# Patient Record
Sex: FEMALE | Race: WHITE | NOT HISPANIC OR LATINO | Employment: OTHER | URBAN - METROPOLITAN AREA
[De-identification: names, ages, dates, MRNs, and addresses within clinical notes are randomized per-mention and may not be internally consistent; named-entity substitution may affect disease eponyms.]

---

## 2022-05-20 ENCOUNTER — OFFICE VISIT (OUTPATIENT)
Dept: GASTROENTEROLOGY | Facility: CLINIC | Age: 77
End: 2022-05-20
Payer: MEDICARE

## 2022-05-20 ENCOUNTER — TELEPHONE (OUTPATIENT)
Dept: GASTROENTEROLOGY | Facility: CLINIC | Age: 77
End: 2022-05-20

## 2022-05-20 VITALS
DIASTOLIC BLOOD PRESSURE: 60 MMHG | WEIGHT: 126 LBS | HEIGHT: 66 IN | SYSTOLIC BLOOD PRESSURE: 104 MMHG | BODY MASS INDEX: 20.25 KG/M2

## 2022-05-20 DIAGNOSIS — D50.0 IRON DEFICIENCY ANEMIA DUE TO CHRONIC BLOOD LOSS: Primary | ICD-10-CM

## 2022-05-20 DIAGNOSIS — Z98.890 STATUS POST DILATION OF ESOPHAGEAL NARROWING: ICD-10-CM

## 2022-05-20 DIAGNOSIS — D50.0 IRON DEFICIENCY ANEMIA DUE TO CHRONIC BLOOD LOSS: ICD-10-CM

## 2022-05-20 DIAGNOSIS — K21.01 GASTROESOPHAGEAL REFLUX DISEASE WITH ESOPHAGITIS AND HEMORRHAGE: ICD-10-CM

## 2022-05-20 DIAGNOSIS — K76.9 LIVER LESION: ICD-10-CM

## 2022-05-20 DIAGNOSIS — Z87.19 STATUS POST DILATION OF ESOPHAGEAL NARROWING: ICD-10-CM

## 2022-05-20 DIAGNOSIS — K29.70 HELICOBACTER PYLORI GASTRITIS: ICD-10-CM

## 2022-05-20 DIAGNOSIS — K22.2 STRICTURE OF ESOPHAGUS: ICD-10-CM

## 2022-05-20 DIAGNOSIS — B96.81 HELICOBACTER PYLORI GASTRITIS: ICD-10-CM

## 2022-05-20 DIAGNOSIS — E44.0 MODERATE PROTEIN-CALORIE MALNUTRITION (HCC): ICD-10-CM

## 2022-05-20 DIAGNOSIS — F10.10 ALCOHOL ABUSE: ICD-10-CM

## 2022-05-20 PROBLEM — K21.00 GASTRO-ESOPHAGEAL REFLUX DISEASE WITH ESOPHAGITIS: Status: ACTIVE | Noted: 2017-11-10

## 2022-05-20 PROBLEM — N17.0 ACUTE KIDNEY FAILURE WITH TUBULAR NECROSIS (HCC): Status: ACTIVE | Noted: 2021-10-04

## 2022-05-20 PROBLEM — D36.9 TUBULAR ADENOMA: Status: ACTIVE | Noted: 2017-11-15

## 2022-05-20 PROBLEM — N32.81 OVERACTIVE BLADDER: Status: ACTIVE | Noted: 2018-06-18

## 2022-05-20 PROBLEM — J47.9 BRONCHIECTASIS WITHOUT COMPLICATION (HCC): Status: ACTIVE | Noted: 2018-08-31

## 2022-05-20 PROCEDURE — 99214 OFFICE O/P EST MOD 30 MIN: CPT | Performed by: INTERNAL MEDICINE

## 2022-05-20 RX ORDER — VITAMIN B COMPLEX
1 CAPSULE ORAL DAILY
COMMUNITY

## 2022-05-20 RX ORDER — LANOLIN ALCOHOL/MO/W.PET/CERES
100 CREAM (GRAM) TOPICAL DAILY
COMMUNITY

## 2022-05-20 RX ORDER — ROSUVASTATIN CALCIUM 40 MG/1
40 TABLET, COATED ORAL DAILY
COMMUNITY

## 2022-05-20 RX ORDER — PANTOPRAZOLE SODIUM 40 MG/1
40 TABLET, DELAYED RELEASE ORAL DAILY
Qty: 30 TABLET | Refills: 5 | Status: SHIPPED | OUTPATIENT
Start: 2022-05-20

## 2022-05-20 RX ORDER — PANTOPRAZOLE SODIUM 40 MG/1
40 TABLET, DELAYED RELEASE ORAL DAILY
COMMUNITY
End: 2022-05-20 | Stop reason: SDUPTHER

## 2022-05-20 RX ORDER — ACETAMINOPHEN 500 MG
500 TABLET ORAL EVERY 6 HOURS PRN
COMMUNITY

## 2022-05-20 NOTE — PROGRESS NOTES
[id number] [employer] [facility] - Outpatient Follow-up Note  Luis Enrique Gross 77 y o  female MRN: [id number]  Encounter: [id number]    ASSESSMENT AND PLAN:      1  Iron deficiency anemia due to chronic blood loss  76F with ETOH abuse (quit since 4/22/22 when hospitalized) here today for f/u  No overt GIB  Likely 2/2 poor PO intake but will need to evaluate for GI blood loss  Did have some esophagitis at last EGD but unclear if that could drop her hgb to 8 1     - CBC and differential; Future  - Comprehensive metabolic panel; Future  - Fe+TIBC+Lc; Future    - Schedule EGD/COLON at hospital setting for possible balloon dilation and anemia work up  - Pending iron panel, consider IV iron with hematology    - Sod Picosulfate-Mag Ox-Cit Acd 10-3 5-12 MG-GM -GM/160ML SOLN; Take 160 mL by mouth once for 1 dose Take 160 mL by mouth once for 1 dose  Dispense: 160 mL; Refill: 0    2  Gastroesophageal reflux disease with esophagitis and hemorrhage  Grade C esophagitis noted on EGD  Cont PPI for now  - Continue ETOH cessation  - pantoprazole (PROTONIX) 40 mg tablet; Take 1 tablet (40 mg total) by mouth in the morning  Dispense: 30 tablet; Refill: 5    3  Stricture of esophagus  S/p balloon dilation to 20mm  Will follow up  Dysphagia much improved  - pantoprazole (PROTONIX) 40 mg tablet; Take 1 tablet (40 mg total) by mouth in the morning  Dispense: 30 tablet; Refill: 5    4  Status post dilation of esophageal narrowing  EGD w dil 4/6/22    5  Helicobacter pylori gastritis  S/p tx  Will rebiopsy at time of EGD  6  Liver lesion  CT and MRI reviewed from [facility]  Benign cysts  No further work up needed  7  Alcohol abuse  Quit when hospitalized 4/4/22  Trying to look for ETOH rehab - will f/u w PCP  8  Moderate protein-calorie malnutrition ([address] )  Weight stabilized  Will recheck in 3 months        Followup Appointment: 3 months ______________________________________________________________________    Chief Complaint   Patient presents with    Follow-up     [facility]     HPI:  This is a 77 female who presents today with her  for follow-up  She was admitted to [facility] on April 4th and discharged on April 7th, sent to rehab and discharged on April 30th  During the hospitalization, she was admitted for weight loss, nausea vomiting in setting of alcohol abuse  She had lost over 30 lb according to her family  Patient states that this was due to not being able to eat  EGD at the time of admission did show esophageal stricture and H pylori gastritis  She was dilated to 20 mm and H pylori was treated for 2 weeks with triple therapy  Since, she has not been drinking any alcohol and her swallowing has since improved  She has stabilized with her weight  She she is back home and doing much better  Trying to get into inpatient rehab but her insurance does not cover so is working with her PCP to get other options  Regarding anemia, patient denies any GI bleeding  No further nausea  No diarrhea  No rectal bleeding  Last colonoscopy was over 15 years ago      Historical Information   Past Medical History:   Diagnosis Date    Hyperlipidemia     Hypertension      Past Surgical History:   Procedure Laterality Date    COLONOSCOPY       Social History     Substance and Sexual Activity   Alcohol Use Yes    Comment: social     Social History     Substance and Sexual Activity   Drug Use Not on file     Social History     Tobacco Use   Smoking Status Never Smoker   Smokeless Tobacco Never Used     Family History   Problem Relation Age of Onset    Colon cancer Neg Hx     Colon polyps Neg Hx          Current Outpatient Medications:     acetaminophen (TYLENOL) 500 mg tablet    Apoaequorin (Prevagen) 10 MG CAPS    b complex vitamins capsule    pantoprazole (PROTONIX) 40 mg tablet    rosuvastatin (CRESTOR) 40 MG tablet    Sod Picosulfate-Mag Ox-Cit Acd 10-3 5-12 MG-GM -GM/160ML SOLN    thiamine 100 MG tablet  Allergies   Allergen Reactions    Codeine GI Intolerance     Reviewed medications and allergies and updated as indicated    PHYSICAL EXAM:    Blood pressure 104/60, height 5' 6" (1 676 m), weight 57 2 kg (126 lb)  Body mass index is 20 34 kg/m²  General Appearance: NAD, cooperative, alert  Eyes: Anicteric, PERRLA, EOMI  ENT:  Normocephalic, atraumatic, normal mucosa  Neck:  Supple, symmetrical, trachea midline  Resp:  Clear to auscultation bilaterally; no rales, rhonchi or wheezing; respirations unlabored   CV:  S1 S2, Regular rate and rhythm; no murmur, rub, or gallop  GI:  Soft, non-tender, non-distended; normal bowel sounds; no masses, no organomegaly   Rectal: Deferred  Musculoskeletal: No cyanosis, clubbing or edema  Limited ROM  Walks with a walker  Skin:  No jaundice, rashes, or lesions   Heme/Lymph: No palpable cervical lymphadenopathy  Psych: Normal affect, good eye contact  Neuro: No gross deficits, AAOx3    Lab Results: All labs from [facility] from April 2022 reviewed  Macrocytic anemia  Radiology Results:   CT and MRI of the liver reviewed  The liver lesions are benign  Multiple nonenhancing hepatic lesions of fluid no consistent with liver cysts  Nothing concerning

## 2022-05-24 RX ORDER — SODIUM PICOSULFATE, MAGNESIUM OXIDE, AND ANHYDROUS CITRIC ACID 10; 3.5; 12 MG/160ML; G/160ML; G/160ML
LIQUID ORAL
Qty: 320 ML | Refills: 0 | Status: SHIPPED | COMMUNITY
Start: 2022-05-24

## 2022-05-24 NOTE — TELEPHONE ENCOUNTER
I called patient at [phone] reached her voicemail  I requested a callback to offer her a sample of the Clenpiq  If she does not want to come back to the office to  the sample, we will need to discuss an alternative prep

## 2022-05-24 NOTE — TELEPHONE ENCOUNTER
Pt called w/ ques; her phone kept cutting out  Noted sample prep was being offered  She did not know if she wanted it/is unsure she could get it picked up in time/did not know when proc was  Pt has combo sched'd @ [facility] 6/14  Ret'd call  Pt will accept prep sample of Clenpiq/is unsure when she will

## 2022-05-24 NOTE — TELEPHONE ENCOUNTER
Patient is scheduled for Colon at [facility] 6/14/22, she is not sure if she can come to the office to  bowel prep  Awaiting her callback as there was a poor connection with her phone

## 2022-05-31 ENCOUNTER — TELEPHONE (OUTPATIENT)
Dept: GASTROENTEROLOGY | Facility: CLINIC | Age: 77
End: 2022-05-31

## 2022-05-31 NOTE — TELEPHONE ENCOUNTER
Scheduled date of colonoscopy (as of today): 06/14/2022  Physician performing colonoscopy: Aniceto Maradiaga  Location of colonoscopy: [facility]  Bowel prep reviewed with patient: Clenpiq  Instructions reviewed with patient by: Aurora Catalan  Clearances:

## 2022-06-14 ENCOUNTER — TELEPHONE (OUTPATIENT)
Dept: GASTROENTEROLOGY | Facility: CLINIC | Age: 77
End: 2022-06-14

## 2022-06-14 NOTE — TELEPHONE ENCOUNTER
Stephen Cummings [phone] Please scan in egd and colon from [facility] today once path results available  Pt will need a small bowel capsule for iron def anemia  Thank you  Per Dr MALDONADO [facility]

## 2022-08-08 NOTE — TELEPHONE ENCOUNTER
Left message for patient that Dr Cezar Nickerson was hoping to get capsule done before OV and requested she call back to schedule

## 2023-09-05 ENCOUNTER — NURSE TRIAGE (OUTPATIENT)
Age: 78
End: 2023-09-05

## 2023-09-05 NOTE — TELEPHONE ENCOUNTER
Patient experiencing mid upper chest pain as previous April 2022. EGD w/dilation performed 4/2022. Patient agreeable to see another provider for earlier appointment. Patient scheduled with Dr. Pooja Vargas in [facility] 9/7/23. She was instructed to report to ER if symptoms increase.

## 2023-09-05 NOTE — TELEPHONE ENCOUNTER
----- Message from 51wan sent at 9/5/2023 10:52 AM EDT -----  Pt called in along with  requesting to speak to a nurse in regards to pts symptoms. Offered an appt as pt only wants to see Dr. Macario Contreras. First available is 11/24/23. Pt did not want to wait that long. Pt stated that she would like to consult with a nurse as she is having pain in her chest/vomiting and weakness in her legs. Pt stated that she will then go to the ER if symptoms worsen. Consent: Written consent was obtained and risks were reviewed including but not limited to scarring, infection, bleeding, scabbing, incomplete removal, nerve damage and allergy to anesthesia.

## 2023-09-07 ENCOUNTER — OFFICE VISIT (OUTPATIENT)
Age: 78
End: 2023-09-07
Payer: MEDICARE

## 2023-09-07 ENCOUNTER — TELEPHONE (OUTPATIENT)
Age: 78
End: 2023-09-07

## 2023-09-07 VITALS
DIASTOLIC BLOOD PRESSURE: 58 MMHG | HEIGHT: 66 IN | BODY MASS INDEX: 20.09 KG/M2 | WEIGHT: 125 LBS | SYSTOLIC BLOOD PRESSURE: 98 MMHG

## 2023-09-07 DIAGNOSIS — K21.00 GASTROESOPHAGEAL REFLUX DISEASE WITH ESOPHAGITIS WITHOUT HEMORRHAGE: Primary | ICD-10-CM

## 2023-09-07 DIAGNOSIS — K22.2 PEPTIC STRICTURE OF ESOPHAGUS: ICD-10-CM

## 2023-09-07 PROCEDURE — 99214 OFFICE O/P EST MOD 30 MIN: CPT | Performed by: INTERNAL MEDICINE

## 2023-09-07 RX ORDER — OMEPRAZOLE 40 MG/1
40 CAPSULE, DELAYED RELEASE ORAL DAILY
Qty: 90 CAPSULE | Refills: 4 | Status: SHIPPED | OUTPATIENT
Start: 2023-09-07

## 2023-09-07 RX ORDER — PUMPKIN SEED EXTRACT/SOY GERM 300 MG
CAPSULE ORAL
COMMUNITY

## 2023-09-07 RX ORDER — LOSARTAN POTASSIUM 25 MG/1
50 TABLET ORAL DAILY
COMMUNITY
Start: 2023-08-08

## 2023-09-07 RX ORDER — OXYBUTYNIN CHLORIDE 5 MG/1
TABLET, EXTENDED RELEASE ORAL
COMMUNITY
Start: 2023-08-25

## 2023-09-07 NOTE — H&P (VIEW-ONLY)
[facility] - Outpatient Follow-up Note  Carol Aguero 69 y.o. female MRN: [id number]  Encounter: [id number]    ASSESSMENT AND PLAN:      1. Gastroesophageal reflux disease with esophagitis without hemorrhage  She has known LA grade C erosive esophagitis, but seemingly has been off PPI and her symptoms have returned. I will proceed with upper endoscopy possible dilation and have prescribed omeprazole, advising her that she will need to take it indefinitely due to her severe erosive disease  - omeprazole (PriLOSEC) 40 MG capsule; Take 1 capsule (40 mg total) by mouth daily  Dispense: 90 capsule; Refill: 4  - EGD; Future    2. Peptic stricture of esophagus  As above  - omeprazole (PriLOSEC) 40 MG capsule; Take 1 capsule (40 mg total) by mouth daily  Dispense: 90 capsule; Refill: 4  - EGD; Future      Follow up appointment: For upper endoscopy and dilation  ______________________________________________________________________    Chief Complaint   Patient presents with   • Chest Pain     Patient having same symptoms as the last visit     HPI:   Patient is a 69 y.o. female with a significant PMH of LA grade C erosive esophagitis and peptic stricture presenting for follow up regarding return of her symptoms of heartburn, dysphagia, and odynophagia. About a year ago she was admitted to [facility] and upper endoscopy was performed that showed LA grade C erosive esophagitis and a peptic stricture that was dilated with TTS balloon up to 20 mm. She felt great after that endoscopy while taking pantoprazole. Repeat an endoscopy did show resolution of the esophagitis, however she seemingly stopped taking her pantoprazole. She has gotten a couple rounds of over-the-counter Prilosec to help with her symptoms, but she now has dysphagia to solid foods as well as odynophagia with even liquids.     Historical Information   Past Medical History:   Diagnosis Date   • Hyperlipidemia • Hypertension      Past Surgical History:   Procedure Laterality Date   • COLONOSCOPY       Social History     Substance and Sexual Activity   Alcohol Use Yes    Comment: social     Social History     Substance and Sexual Activity   Drug Use Not on file     Social History     Tobacco Use   Smoking Status Never   Smokeless Tobacco Never     Family History   Problem Relation Age of Onset   • Colon cancer Neg Hx    • Colon polyps Neg Hx          Current Outpatient Medications:   •  acetaminophen (TYLENOL) 500 mg tablet  •  Apoaequorin (Prevagen) 10 MG CAPS  •  losartan (COZAAR) 25 mg tablet  •  omeprazole (PriLOSEC) 40 MG capsule  •  oxybutynin (DITROPAN-XL) 5 mg 24 hr tablet  •  Pumpkin Seed-Soy Germ (AZO Bladder Control/Go-Less) CAPS  •  rosuvastatin (CRESTOR) 40 MG tablet  •  b complex vitamins capsule  •  Sod Picosulfate-Mag Ox-Cit Acd (Clenpiq) 10-3.5-12 MG-GM -GM/160ML SOLN  •  thiamine 100 MG tablet  Allergies   Allergen Reactions   • Codeine GI Intolerance     Reviewed medications and allergies and updated as indicated    PHYSICAL EXAM:    Blood pressure 98/58, height 5' 6" (1.676 m), weight 56.7 kg (125 lb). Body mass index is 20.18 kg/m². General Appearance: NAD, cooperative, alert  Eyes: Anicteric  GI:  Soft, non-tender, non-distended; normal bowel sounds; no masses, no organomegaly   Rectal: Deferred  Musculoskeletal: No edema. Skin:  No jaundice    Lab Results:   No results found for: "WBC", "HGB", "MCV", "PLT"  No results found for: "NA", "K", "CL", "CO2", "ANIONGAP", "BUN", "CREATININE", "GLUCOSE", "GLUF", "CALCIUM", "CORRECTEDCA", "AST", "ALT", "ALKPHOS", "PROT", "BILITOT", "EGFR"  No results found for: "IRON", "TIBC", "FERRITIN"  No results found for: "LIPASE"    Radiology Results:   No results found.

## 2023-09-07 NOTE — PROGRESS NOTES
[facility] - Outpatient Follow-up Note  Herminia Arellano 69 y.o. female MRN: [id number]  Encounter: [id number]    ASSESSMENT AND PLAN:      1. Gastroesophageal reflux disease with esophagitis without hemorrhage  She has known LA grade C erosive esophagitis, but seemingly has been off PPI and her symptoms have returned. I will proceed with upper endoscopy possible dilation and have prescribed omeprazole, advising her that she will need to take it indefinitely due to her severe erosive disease  - omeprazole (PriLOSEC) 40 MG capsule; Take 1 capsule (40 mg total) by mouth daily  Dispense: 90 capsule; Refill: 4  - EGD; Future    2. Peptic stricture of esophagus  As above  - omeprazole (PriLOSEC) 40 MG capsule; Take 1 capsule (40 mg total) by mouth daily  Dispense: 90 capsule; Refill: 4  - EGD; Future      Follow up appointment: For upper endoscopy and dilation  ______________________________________________________________________    Chief Complaint   Patient presents with   • Chest Pain     Patient having same symptoms as the last visit     HPI:   Patient is a 69 y.o. female with a significant PMH of LA grade C erosive esophagitis and peptic stricture presenting for follow up regarding return of her symptoms of heartburn, dysphagia, and odynophagia. About a year ago she was admitted to [facility] and upper endoscopy was performed that showed LA grade C erosive esophagitis and a peptic stricture that was dilated with TTS balloon up to 20 mm. She felt great after that endoscopy while taking pantoprazole. Repeat an endoscopy did show resolution of the esophagitis, however she seemingly stopped taking her pantoprazole. She has gotten a couple rounds of over-the-counter Prilosec to help with her symptoms, but she now has dysphagia to solid foods as well as odynophagia with even liquids.     Historical Information   Past Medical History:   Diagnosis Date   • Hyperlipidemia • Hypertension      Past Surgical History:   Procedure Laterality Date   • COLONOSCOPY       Social History     Substance and Sexual Activity   Alcohol Use Yes    Comment: social     Social History     Substance and Sexual Activity   Drug Use Not on file     Social History     Tobacco Use   Smoking Status Never   Smokeless Tobacco Never     Family History   Problem Relation Age of Onset   • Colon cancer Neg Hx    • Colon polyps Neg Hx          Current Outpatient Medications:   •  acetaminophen (TYLENOL) 500 mg tablet  •  Apoaequorin (Prevagen) 10 MG CAPS  •  losartan (COZAAR) 25 mg tablet  •  omeprazole (PriLOSEC) 40 MG capsule  •  oxybutynin (DITROPAN-XL) 5 mg 24 hr tablet  •  Pumpkin Seed-Soy Germ (AZO Bladder Control/Go-Less) CAPS  •  rosuvastatin (CRESTOR) 40 MG tablet  •  b complex vitamins capsule  •  Sod Picosulfate-Mag Ox-Cit Acd (Clenpiq) 10-3.5-12 MG-GM -GM/160ML SOLN  •  thiamine 100 MG tablet  Allergies   Allergen Reactions   • Codeine GI Intolerance     Reviewed medications and allergies and updated as indicated    PHYSICAL EXAM:    Blood pressure 98/58, height 5' 6" (1.676 m), weight 56.7 kg (125 lb). Body mass index is 20.18 kg/m². General Appearance: NAD, cooperative, alert  Eyes: Anicteric  GI:  Soft, non-tender, non-distended; normal bowel sounds; no masses, no organomegaly   Rectal: Deferred  Musculoskeletal: No edema. Skin:  No jaundice    Lab Results:   No results found for: "WBC", "HGB", "MCV", "PLT"  No results found for: "NA", "K", "CL", "CO2", "ANIONGAP", "BUN", "CREATININE", "GLUCOSE", "GLUF", "CALCIUM", "CORRECTEDCA", "AST", "ALT", "ALKPHOS", "PROT", "BILITOT", "EGFR"  No results found for: "IRON", "TIBC", "FERRITIN"  No results found for: "LIPASE"    Radiology Results:   No results found.

## 2023-09-07 NOTE — TELEPHONE ENCOUNTER
Scheduled date of EGD(as of today):9/11/23  Physician performing EGD:PHILLIP  Location of EGD:[facility]  Instructions reviewed with patient by:MONTY  Clearances: N

## 2023-09-11 ENCOUNTER — ANESTHESIA (OUTPATIENT)
Dept: GASTROENTEROLOGY | Facility: AMBULATORY SURGERY CENTER | Age: 78
End: 2023-09-11

## 2023-09-11 ENCOUNTER — ANESTHESIA EVENT (OUTPATIENT)
Dept: GASTROENTEROLOGY | Facility: AMBULATORY SURGERY CENTER | Age: 78
End: 2023-09-11

## 2023-09-11 ENCOUNTER — HOSPITAL ENCOUNTER (OUTPATIENT)
Dept: GASTROENTEROLOGY | Facility: AMBULATORY SURGERY CENTER | Age: 78
Discharge: HOME | End: 2023-09-11
Attending: INTERNAL MEDICINE
Payer: MEDICARE

## 2023-09-11 VITALS
WEIGHT: 125 LBS | BODY MASS INDEX: 20.09 KG/M2 | HEIGHT: 66 IN | OXYGEN SATURATION: 97 % | TEMPERATURE: 98 F | DIASTOLIC BLOOD PRESSURE: 56 MMHG | SYSTOLIC BLOOD PRESSURE: 96 MMHG | RESPIRATION RATE: 22 BRPM | HEART RATE: 72 BPM

## 2023-09-11 DIAGNOSIS — K22.2 PEPTIC STRICTURE OF ESOPHAGUS: ICD-10-CM

## 2023-09-11 DIAGNOSIS — K21.00 GASTROESOPHAGEAL REFLUX DISEASE WITH ESOPHAGITIS WITHOUT HEMORRHAGE: ICD-10-CM

## 2023-09-11 PROCEDURE — 43249 ESOPH EGD DILATION <30 MM: CPT | Performed by: INTERNAL MEDICINE

## 2023-09-11 RX ORDER — PROPOFOL 10 MG/ML
INJECTION, EMULSION INTRAVENOUS AS NEEDED
Status: DISCONTINUED | OUTPATIENT
Start: 2023-09-11 | End: 2023-09-11

## 2023-09-11 RX ORDER — SODIUM CHLORIDE, SODIUM LACTATE, POTASSIUM CHLORIDE, CALCIUM CHLORIDE 600; 310; 30; 20 MG/100ML; MG/100ML; MG/100ML; MG/100ML
50 INJECTION, SOLUTION INTRAVENOUS CONTINUOUS
Status: DISCONTINUED | OUTPATIENT
Start: 2023-09-11 | End: 2023-09-15 | Stop reason: HOSPADM

## 2023-09-11 RX ORDER — SODIUM CHLORIDE, SODIUM LACTATE, POTASSIUM CHLORIDE, CALCIUM CHLORIDE 600; 310; 30; 20 MG/100ML; MG/100ML; MG/100ML; MG/100ML
INJECTION, SOLUTION INTRAVENOUS CONTINUOUS PRN
Status: DISCONTINUED | OUTPATIENT
Start: 2023-09-11 | End: 2023-09-11

## 2023-09-11 RX ORDER — LIDOCAINE HYDROCHLORIDE 10 MG/ML
INJECTION, SOLUTION EPIDURAL; INFILTRATION; INTRACAUDAL; PERINEURAL AS NEEDED
Status: DISCONTINUED | OUTPATIENT
Start: 2023-09-11 | End: 2023-09-11

## 2023-09-11 RX ORDER — PHENYLEPHRINE HYDROCHLORIDE 10 MG/ML
INJECTION INTRAVENOUS AS NEEDED
Status: DISCONTINUED | OUTPATIENT
Start: 2023-09-11 | End: 2023-09-11

## 2023-09-11 RX ADMIN — PHENYLEPHRINE HYDROCHLORIDE 200 MCG: 10 INJECTION INTRAVENOUS at 15:02

## 2023-09-11 RX ADMIN — PHENYLEPHRINE HYDROCHLORIDE 100 MCG: 10 INJECTION INTRAVENOUS at 14:57

## 2023-09-11 RX ADMIN — LIDOCAINE HYDROCHLORIDE 70 MG: 10 INJECTION, SOLUTION EPIDURAL; INFILTRATION; INTRACAUDAL; PERINEURAL at 14:54

## 2023-09-11 RX ADMIN — SODIUM CHLORIDE, SODIUM LACTATE, POTASSIUM CHLORIDE, CALCIUM CHLORIDE 50 ML/HR: 600; 310; 30; 20 INJECTION, SOLUTION INTRAVENOUS at 14:44

## 2023-09-11 RX ADMIN — PROPOFOL 100 MG: 10 INJECTION, EMULSION INTRAVENOUS at 14:54

## 2023-09-11 RX ADMIN — PROPOFOL 50 MG: 10 INJECTION, EMULSION INTRAVENOUS at 15:02

## 2023-09-11 RX ADMIN — SODIUM CHLORIDE, SODIUM LACTATE, POTASSIUM CHLORIDE, CALCIUM CHLORIDE: 600; 310; 30; 20 INJECTION, SOLUTION INTRAVENOUS at 14:38

## 2023-09-11 RX ADMIN — PROPOFOL 50 MG: 10 INJECTION, EMULSION INTRAVENOUS at 14:58

## 2023-09-11 NOTE — INTERVAL H&P NOTE
H&P reviewed. After examining the patient I find no changes in the patients condition since the H&P had been written.     Vitals:    09/11/23 1435   BP: 96/55   Pulse: (!) 120   Resp: 22   Temp: 98 °F (36.7 °C)   SpO2: 99%

## 2023-09-11 NOTE — ANESTHESIA PREPROCEDURE EVALUATION
Procedure:  EGD    Relevant Problems   CARDIO   (+) Essential (primary) hypertension   (+) Hyperlipidemia, unspecified      /RENAL   (+) Acute kidney failure with tubular necrosis (HCC)      NEURO/PSYCH   (+) Recurrent major depressive disorder in remission [facility])        Physical Exam    Airway    Mallampati score: II  TM Distance: <3 FB  Neck ROM: full     Dental       Cardiovascular      Pulmonary      Other Findings        Anesthesia Plan  ASA Score- 2     Anesthesia Type- IV sedation with anesthesia with ASA Monitors. Additional Monitors:   Airway Plan:     Comment: 12:00 - last of of PO bowel prep    Patient educated on the possibility for awareness under sedation and of the possibility of airway intervention in the event of an airway or procedural emergency  . Plan Factors-Exercise tolerance (METS): <4 METS. Chart reviewed. Patient summary reviewed. Patient is not a current smoker. Induction- intravenous. Postoperative Plan-     Informed Consent- Anesthetic plan and risks discussed with patient. I personally reviewed this patient with the CRNA. Discussed and agreed on the Anesthesia Plan with the CRNA. Eunice Tadeo

## 2023-09-11 NOTE — ANESTHESIA POSTPROCEDURE EVALUATION
Post-Op Assessment Note    CV Status:  Stable    Pain management: adequate     Mental Status:  Awake and lethargic   Hydration Status:  Stable   PONV Controlled:  None   Airway Patency:  Patent      Post Op Vitals Reviewed: Yes      Staff: CRNA         No notable events documented.     BP      Temp      Pulse    Resp      SpO2

## 2023-09-12 ENCOUNTER — TELEPHONE (OUTPATIENT)
Age: 78
End: 2023-09-12

## 2023-09-12 NOTE — TELEPHONE ENCOUNTER
Patients GI provider:  Dr. Doc Morrison    Number to return call: [phone]    Reason for call: Pt calling to scheduling her EGD with balloon dilation in two weeks.     Scheduled procedure/appointment  date if applicable:N/A

## 2023-09-13 ENCOUNTER — TELEPHONE (OUTPATIENT)
Dept: GASTROENTEROLOGY | Facility: CLINIC | Age: 78
End: 2023-09-13

## 2023-09-13 NOTE — TELEPHONE ENCOUNTER
Scheduled date of EGD(as of today):09/29/2023  Physician performing EGD:dr chase  Location of EGD:[facility]  Instructions reviewed with patient by:tk  Clearances: no    Emailed egd instructions

## 2023-09-27 ENCOUNTER — TELEPHONE (OUTPATIENT)
Dept: GASTROENTEROLOGY | Facility: AMBULATORY SURGERY CENTER | Age: 78
End: 2023-09-27

## 2023-09-29 ENCOUNTER — HOSPITAL ENCOUNTER (OUTPATIENT)
Dept: GASTROENTEROLOGY | Facility: AMBULATORY SURGERY CENTER | Age: 78
Discharge: HOME | End: 2023-09-29
Attending: INTERNAL MEDICINE
Payer: MEDICARE

## 2023-09-29 ENCOUNTER — ANESTHESIA (OUTPATIENT)
Dept: GASTROENTEROLOGY | Facility: AMBULATORY SURGERY CENTER | Age: 78
End: 2023-09-29

## 2023-09-29 ENCOUNTER — ANESTHESIA EVENT (OUTPATIENT)
Dept: GASTROENTEROLOGY | Facility: AMBULATORY SURGERY CENTER | Age: 78
End: 2023-09-29

## 2023-09-29 VITALS
WEIGHT: 125 LBS | BODY MASS INDEX: 20.09 KG/M2 | HEIGHT: 66 IN | RESPIRATION RATE: 18 BRPM | DIASTOLIC BLOOD PRESSURE: 54 MMHG | SYSTOLIC BLOOD PRESSURE: 93 MMHG | TEMPERATURE: 98.5 F | OXYGEN SATURATION: 100 % | HEART RATE: 87 BPM

## 2023-09-29 DIAGNOSIS — K22.2 PEPTIC STRICTURE OF ESOPHAGUS: ICD-10-CM

## 2023-09-29 PROCEDURE — 43235 EGD DIAGNOSTIC BRUSH WASH: CPT | Performed by: INTERNAL MEDICINE

## 2023-09-29 RX ORDER — SODIUM CHLORIDE, SODIUM LACTATE, POTASSIUM CHLORIDE, CALCIUM CHLORIDE 600; 310; 30; 20 MG/100ML; MG/100ML; MG/100ML; MG/100ML
50 INJECTION, SOLUTION INTRAVENOUS CONTINUOUS
Status: DISCONTINUED | OUTPATIENT
Start: 2023-09-29 | End: 2023-10-03 | Stop reason: HOSPADM

## 2023-09-29 RX ORDER — PROPOFOL 10 MG/ML
INJECTION, EMULSION INTRAVENOUS AS NEEDED
Status: DISCONTINUED | OUTPATIENT
Start: 2023-09-29 | End: 2023-09-29

## 2023-09-29 RX ADMIN — SODIUM CHLORIDE, SODIUM LACTATE, POTASSIUM CHLORIDE, CALCIUM CHLORIDE: 600; 310; 30; 20 INJECTION, SOLUTION INTRAVENOUS at 10:45

## 2023-09-29 RX ADMIN — SODIUM CHLORIDE, SODIUM LACTATE, POTASSIUM CHLORIDE, CALCIUM CHLORIDE 50 ML/HR: 600; 310; 30; 20 INJECTION, SOLUTION INTRAVENOUS at 10:29

## 2023-09-29 RX ADMIN — PROPOFOL 100 MG: 10 INJECTION, EMULSION INTRAVENOUS at 10:44

## 2023-09-29 NOTE — ANESTHESIA POSTPROCEDURE EVALUATION
Post-Op Assessment Note    CV Status:  Stable  Pain Score: 0    Pain management: adequate     Mental Status:  Alert and awake   Hydration Status:  Euvolemic   PONV Controlled:  Controlled   Airway Patency:  Patent      Post Op Vitals Reviewed: Yes      Staff: CRNA         No notable events documented.     BP   100/58   Temp   98   Pulse 105   Resp   16   SpO2   97

## 2023-09-29 NOTE — H&P
History and Physical - [facility]  Carol Aguero 69 y.o. female MRN: [id number]    HPI: Carol Aguero is a 69 y.o. female who presents for repeat upper endoscopy with possible dilation due to large hiatal hernia and peptic stricture. Her dysphagia is much improved    REVIEW OF SYSTEMS: Per the HPI, and otherwise unremarkable.     Historical Information   Past Medical History:   Diagnosis Date   • Chronic kidney disease    • GERD (gastroesophageal reflux disease)    • Hyperlipidemia    • Hypertension      Past Surgical History:   Procedure Laterality Date   • BACK SURGERY     • COLONOSCOPY     • EGD     • TUBAL LIGATION       Social History   Social History     Substance and Sexual Activity   Alcohol Use Yes    Comment: social     Social History     Substance and Sexual Activity   Drug Use Never     Social History     Tobacco Use   Smoking Status Never   Smokeless Tobacco Never     Family History   Problem Relation Age of Onset   • Colon cancer Neg Hx    • Colon polyps Neg Hx        Meds/Allergies       Current Outpatient Medications:   •  acetaminophen (TYLENOL) 500 mg tablet  •  Apoaequorin (Prevagen) 10 MG CAPS  •  losartan (COZAAR) 25 mg tablet  •  omeprazole (PriLOSEC) 40 MG capsule  •  oxybutynin (DITROPAN-XL) 5 mg 24 hr tablet  •  Pumpkin Seed-Soy Germ (AZO Bladder Control/Go-Less) CAPS  •  rosuvastatin (CRESTOR) 40 MG tablet  •  b complex vitamins capsule  •  thiamine 100 MG tablet    Current Facility-Administered Medications:   •  lactated ringers infusion, 50 mL/hr, Intravenous, Continuous, Continue from Pre-op at 09/29/23 1032    Allergies   Allergen Reactions   • Codeine GI Intolerance   • Iv Contrast [Iodinated Contrast Media] Hives     Hives, itching       Objective     /58   Pulse (!) 108   Temp 98.5 °F (36.9 °C) (Temporal)   Resp 17   Ht 5' 6" (1.676 m)   Wt 56.7 kg (125 lb)   SpO2 98%   BMI 20.18 kg/m²     PHYSICAL EXAM    Gen: NAD AAOx3  Head: Normocephalic, Atraumatic  CV: S1S2 RRR no m/r/g  CHEST: Clear b/l no c/r/w  ABD: soft, +BS NT/ND  EXT: no edema    ASSESSMENT/PLAN:  This is a 69y.o. year old female here for repeat upper endoscopy with possible dilation, and she is stable and optimized for her procedure.

## 2023-09-29 NOTE — ANESTHESIA PREPROCEDURE EVALUATION
Procedure:  EGD    Relevant Problems   CARDIO   (+) Essential (primary) hypertension   (+) Hyperlipidemia, unspecified      /RENAL   (+) Acute kidney failure with tubular necrosis (HCC)      NEURO/PSYCH   (+) Recurrent major depressive disorder in remission ([address])      Respiratory   (+) Bronchiectasis without complication (HCC)        Physical Exam    Airway    Mallampati score: I  TM Distance: >3 FB  Neck ROM: full     Dental       Cardiovascular  Cardiovascular exam normal    Pulmonary  Pulmonary exam normal     Other Findings        Anesthesia Plan  ASA Score- 2     Anesthesia Type- IV sedation with anesthesia with ASA Monitors. Additional Monitors:   Airway Plan:           Plan Factors-Exercise tolerance (METS): >4 METS. Chart reviewed. EKG reviewed. Imaging results reviewed. Existing labs reviewed. Patient summary reviewed. Induction- intravenous. Postoperative Plan- Plan for postoperative opioid use. Planned trial extubation    Informed Consent- Anesthetic plan and risks discussed with patient. I personally reviewed this patient with the CRNA. Discussed and agreed on the Anesthesia Plan with the CRNA. Vicki Hernández

## 2023-10-03 ENCOUNTER — TELEPHONE (OUTPATIENT)
Dept: GASTROENTEROLOGY | Facility: CLINIC | Age: 78
End: 2023-10-03